# Patient Record
Sex: MALE
[De-identification: names, ages, dates, MRNs, and addresses within clinical notes are randomized per-mention and may not be internally consistent; named-entity substitution may affect disease eponyms.]

---

## 2021-04-11 ENCOUNTER — NURSE TRIAGE (OUTPATIENT)
Dept: OTHER | Facility: CLINIC | Age: 51
End: 2021-04-11

## 2021-04-11 NOTE — TELEPHONE ENCOUNTER
Reason for Disposition   Chest pain lasting longer than 5 minutes and ANY of the following:* Over 39years old* Over 27years old and at least one cardiac risk factor (e.g., diabetes, high blood pressure, high cholesterol, smoker, or strong family history of heart disease)* History of heart disease (i.e., angina, heart attack, heart failure, bypass surgery, takes nitroglycerin)* Pain is crushing, pressure-like, or heavy    Answer Assessment - Initial Assessment Questions  1. LOCATION: \"Where does it hurt? \"    At the sternum near the diagrapgh. 2. RADIATION: \"Does the pain go anywhere else? \" (e.g., into neck, jaw, arms, back)      Jaw.    3. ONSET: \"When did the chest pain begin? \" (Minutes, hours or days)       10-15 minutes ago. 4. PATTERN \"Does the pain come and go, or has it been constant since it started? \"  \"Does it get worse with exertion? \"       Constant. 5. DURATION: \"How long does it last\" (e.g., seconds, minutes, hours)      10-15 minutes    6. SEVERITY: \"How bad is the pain? \"  (e.g., Scale 1-10; mild, moderate, or severe)     - MILD (1-3): doesn't interfere with normal activities      - MODERATE (4-7): interferes with normal activities or awakens from sleep     - SEVERE (8-10): excruciating pain, unable to do any normal activities        5/10. Describes it as a pressure pain in his chest.    7. CARDIAC RISK FACTORS: \"Do you have any history of heart problems or risk factors for heart disease? \" (e.g., angina, prior heart attack; diabetes, high blood pressure, high cholesterol, smoker, or strong family history of heart disease)  Heart attacks on his father's side. 8. PULMONARY RISK FACTORS: \"Do you have any history of lung disease? \"  (e.g., blood clots in lung, asthma, emphysema, birth control pills)  Denies. 9. CAUSE: \"What do you think is causing the chest pain? \"      Denies. 10. OTHER SYMPTOMS: \"Do you have any other symptoms? \" (e.g., dizziness, nausea, vomiting, sweating, fever, difficulty breathing, cough)       Jaw pain, stomach, vision blurry, eyes hurt, breathing is painful. He states it is uncomfortable to breathe. 11. PREGNANCY: \"Is there any chance you are pregnant? \" \"When was your last menstrual period? \"        N/A    Protocols used: CHEST PAIN-ADULT-[place]    Brief description of triage: Chest pain, see above. Triage indicates for patient to call 911 EMS now. Writer explained concern for myocardial infarction and acute coronary syndrome that are life-threatening. Pt confirmed he is not alone and is with family prior to writer ending to call to allow Pt to call 911. Writer called Pt back at  to confirm he was able to reach 911. Pt stated that he is going to wait til Monday to speak with his PCP regarding the chest pain. Writer reminded him that the recommendation was made out of concern for his well-being. Care advice provided, patient verbalizes understanding; denies any other questions or concerns; instructed to call back for any new or worsening symptoms. This triage is a result of a call to [facility]. Please do not respond to the triage nurse through this encounter. Any subsequent communication should be directly with the patient.

## 2023-09-08 ENCOUNTER — NURSE TRIAGE (OUTPATIENT)
Dept: OTHER | Facility: CLINIC | Age: 53
End: 2023-09-08

## 2023-09-09 NOTE — TELEPHONE ENCOUNTER
Location of patient: [place]    Subjective: Caller states \"{that 4 hours ago he started feeling lightheaded, dizzy, diaphoretic, nauseated, he is a para, with a buck catheter. States that he is having aching in his head and neck , with a headache. He also states that his urine is foul smelling. \"     Current Symptoms: malaise    Onset: 4 hours ago; sudden    Associated Symptoms:  see above    Pain Severity: 6/10; aching; constant    Temperature:denies fever at this time by unknown method    What has been tried: unknown     LMP: NA Pregnant: NA    Recommended disposition: Go to ED Now    Care advice provided, patient verbalizes understanding; denies any other questions or concerns; instructed to call back for any new or worsening symptoms. Patient/caller agrees to proceed to nearest Emergency Department    This triage is a result of a call to [address] [place] [address]. Please do not respond to the triage nurse through this encounter. Any subsequent communication should be directly with the patient.     Reason for Disposition   Patient sounds very sick or weak to the triager    Protocols used: Urinary Catheter (e.g., Buck) Symptoms and Questions-ADULT-